# Patient Record
Sex: MALE | Race: WHITE | URBAN - METROPOLITAN AREA
[De-identification: names, ages, dates, MRNs, and addresses within clinical notes are randomized per-mention and may not be internally consistent; named-entity substitution may affect disease eponyms.]

---

## 2017-01-27 ENCOUNTER — OFFICE VISIT (OUTPATIENT)
Dept: DERMATOLOGY CLINIC | Facility: CLINIC | Age: 52
End: 2017-01-27

## 2017-01-27 DIAGNOSIS — L82.1 SEBORRHEIC KERATOSES: Primary | ICD-10-CM

## 2017-01-27 DIAGNOSIS — L81.4 LENTIGO: ICD-10-CM

## 2017-01-27 DIAGNOSIS — D23.9 BENIGN NEOPLASM OF SKIN, UNSPECIFIED LOCATION: ICD-10-CM

## 2017-01-27 PROCEDURE — 99213 OFFICE O/P EST LOW 20 MIN: CPT | Performed by: DERMATOLOGY

## 2017-01-27 PROCEDURE — 99212 OFFICE O/P EST SF 10 MIN: CPT | Performed by: DERMATOLOGY

## 2017-02-02 ENCOUNTER — OFFICE VISIT (OUTPATIENT)
Dept: INTERNAL MEDICINE CLINIC | Facility: CLINIC | Age: 52
End: 2017-02-02

## 2017-02-02 VITALS
HEART RATE: 75 BPM | SYSTOLIC BLOOD PRESSURE: 131 MMHG | HEIGHT: 70 IN | DIASTOLIC BLOOD PRESSURE: 86 MMHG | BODY MASS INDEX: 25.91 KG/M2 | RESPIRATION RATE: 16 BRPM | WEIGHT: 181 LBS

## 2017-02-02 DIAGNOSIS — K21.00 GASTROESOPHAGEAL REFLUX DISEASE WITH ESOPHAGITIS: Primary | ICD-10-CM

## 2017-02-02 DIAGNOSIS — F32.89 OTHER DEPRESSION: ICD-10-CM

## 2017-02-02 PROCEDURE — 99212 OFFICE O/P EST SF 10 MIN: CPT | Performed by: INTERNAL MEDICINE

## 2017-02-02 PROCEDURE — 99214 OFFICE O/P EST MOD 30 MIN: CPT | Performed by: INTERNAL MEDICINE

## 2017-02-02 RX ORDER — OMEPRAZOLE 40 MG/1
40 CAPSULE, DELAYED RELEASE ORAL DAILY
Qty: 90 CAPSULE | Refills: 3 | Status: SHIPPED | OUTPATIENT
Start: 2017-02-02 | End: 2017-06-20

## 2017-02-02 RX ORDER — FLUOXETINE 10 MG/1
CAPSULE ORAL
Qty: 90 CAPSULE | Refills: 3 | Status: SHIPPED | OUTPATIENT
Start: 2017-02-02 | End: 2018-02-24

## 2017-02-02 NOTE — PROGRESS NOTES
Felicia Katz is a 54year old male. HPI:   1. Gastroesophageal reflux disease with esophagitis    Has some reflux symptoms and takes PPI as needed to help with symptoms.  Not wearing tight clothing, not eating before bedtime and elevating the head of denies headaches    EXAM:   /86 mmHg  Pulse 75  Resp 16  Ht 5' 10\" (1.778 m)  Wt 181 lb (82.101 kg)  BMI 25.97 kg/m2  GENERAL: well developed, well nourished,in no apparent distress  SKIN: no rashes,no suspicious lesions  HEENT: atraumatic, normocep

## 2017-02-12 NOTE — PROGRESS NOTES
Santi Madrid is a 54year old male. HPI:     CC:  Patient presents with:  Moles: LOV 4/5/14. Patient presents with mole on right cheek for removal. Patient states it has gotten bigger, but not darker.  Denies pain        Allergies:  Review of patient' CHOLECYSTECTOMY  2008    UPPER GI ENDOSCOPY,BIOPSY  2011    Comment EGD with Biopsy       Social History   Marital Status:   Spouse Name: N/A    Years of Education: N/A  Number of Children: N/A     Occupational History  None on file     Social H cherry-red vascular papules scattered. See map today's date for lesions noted . Otherwise remarkable for lesions as noted on map.   See Assessment /Plan for additional history and physical exam also:    Assessment / plan:    No orders of the defined

## 2017-06-20 ENCOUNTER — OFFICE VISIT (OUTPATIENT)
Dept: INTERNAL MEDICINE CLINIC | Facility: CLINIC | Age: 52
End: 2017-06-20

## 2017-06-20 VITALS
DIASTOLIC BLOOD PRESSURE: 74 MMHG | BODY MASS INDEX: 26 KG/M2 | TEMPERATURE: 98 F | WEIGHT: 178.31 LBS | SYSTOLIC BLOOD PRESSURE: 118 MMHG | HEART RATE: 81 BPM

## 2017-06-20 DIAGNOSIS — J06.9 UPPER RESPIRATORY TRACT INFECTION, UNSPECIFIED TYPE: Primary | ICD-10-CM

## 2017-06-20 DIAGNOSIS — K21.9 GASTROESOPHAGEAL REFLUX DISEASE, ESOPHAGITIS PRESENCE NOT SPECIFIED: ICD-10-CM

## 2017-06-20 PROCEDURE — 99214 OFFICE O/P EST MOD 30 MIN: CPT | Performed by: INTERNAL MEDICINE

## 2017-06-20 PROCEDURE — 99212 OFFICE O/P EST SF 10 MIN: CPT | Performed by: INTERNAL MEDICINE

## 2017-06-20 RX ORDER — ESOMEPRAZOLE MAGNESIUM 40 MG/1
40 FOR SUSPENSION ORAL
Qty: 30 EACH | Refills: 5 | Status: SHIPPED | OUTPATIENT
Start: 2017-06-20 | End: 2018-06-12 | Stop reason: ALTCHOICE

## 2017-06-20 RX ORDER — AMOXICILLIN 875 MG/1
875 TABLET, COATED ORAL 2 TIMES DAILY
Qty: 20 TABLET | Refills: 0 | Status: SHIPPED | OUTPATIENT
Start: 2017-06-20 | End: 2018-06-12 | Stop reason: ALTCHOICE

## 2017-06-20 NOTE — PROGRESS NOTES
HPI:    Patient ID: Burgess Nunez is a 54year old male. Sore Throat   This is a new problem. The current episode started yesterday. The problem has been unchanged. There has been no fever.  Pertinent negatives include no diarrhea or shortness of stephanie 40 mg by mouth every morning before breakfast. Disp: 30 each Rfl: 5   FLUoxetine HCl (PROZAC) 10 MG Oral Cap take 1 (10MG)  by oral route  every day Disp: 90 capsule Rfl: 3   clonazepam (KLONOPIN) 0.5 MG Oral Tab Take  by mouth.  take 1 tablet (0.5MG)  by o (875 mg total) by mouth 2 (two) times daily.       Esomeprazole Magnesium (NEXIUM) 40 MG Oral Powd Pack 30 each 5      Sig: Take 40 mg by mouth every morning before breakfast.           Imaging & Referrals:  None       FD#[id number]  By signing my name below, I,

## 2018-02-15 ENCOUNTER — APPOINTMENT (OUTPATIENT)
Dept: GENERAL RADIOLOGY | Age: 53
End: 2018-02-15
Attending: PHYSICIAN ASSISTANT
Payer: COMMERCIAL

## 2018-02-15 ENCOUNTER — NURSE TRIAGE (OUTPATIENT)
Dept: OTHER | Age: 53
End: 2018-02-15

## 2018-02-15 ENCOUNTER — HOSPITAL ENCOUNTER (OUTPATIENT)
Age: 53
Discharge: HOME | End: 2018-02-15
Payer: COMMERCIAL

## 2018-02-15 VITALS
TEMPERATURE: 98 F | SYSTOLIC BLOOD PRESSURE: 130 MMHG | HEART RATE: 94 BPM | WEIGHT: 170 LBS | DIASTOLIC BLOOD PRESSURE: 91 MMHG | HEIGHT: 70 IN | OXYGEN SATURATION: 98 % | RESPIRATION RATE: 18 BRPM | BODY MASS INDEX: 24.34 KG/M2

## 2018-02-15 DIAGNOSIS — J11.1 INFLUENZA: Primary | ICD-10-CM

## 2018-02-15 PROCEDURE — 99213 OFFICE O/P EST LOW 20 MIN: CPT

## 2018-02-15 PROCEDURE — 99203 OFFICE O/P NEW LOW 30 MIN: CPT

## 2018-02-15 PROCEDURE — 71046 X-RAY EXAM CHEST 2 VIEWS: CPT | Performed by: PHYSICIAN ASSISTANT

## 2018-02-15 NOTE — ED PROVIDER NOTES
Patient Seen in: [facility]    History   Patient presents with:  Cough/URI    Stated Complaint: COUGH    HPI    Patient is a 24-year-old male with a history of GERD and anxiety who presents for evaluation of flu-like symp pain.       Positive for stated complaint: COUGH  Other systems are as noted in HPI. Constitutional and vital signs reviewed. All other systems reviewed and negative except as noted above.     Physical Exam   ED Triage Vitals [02/15/18 1000]  BP: 130/ -negative for acute pathology. Had a very lengthy discussion with patient regarding PE due to his recent travel, low suspicion at this time. Patient denies symptoms of chest pain, shortness of breath or flank pain at this time.   Does not want to go to ER

## 2018-02-15 NOTE — ED INITIAL ASSESSMENT (HPI)
PATIENT REPORTS FLU LIKE SYMPTOMS SINCE SUNDAY. T .  + COUGH. PATIENT REPORTS DRINKING PLENTY OF FLUIDS AND RESTING. PATIENT REPORTS BILATERAL FLANK DISCOMFORT WHEN TAKING DEEP BREATHS. STATES COUGH IS PRODUCTIVE AT TIMES.   LAST FEVER YESTERDAY

## 2018-02-15 NOTE — TELEPHONE ENCOUNTER
Action Requested: Summary for Provider     []  Critical Lab, Recommendations Needed  [] Need Additional Advice  [x]   FYI    []   Need Orders  [] Need Medications Sent to Pharmacy  []  Other     SUMMARY: Pt going to ER for fever, SOB, chest pain.     Pt rep

## 2018-02-16 NOTE — TELEPHONE ENCOUNTER
Spoke with patient (identified name and ) ,advised Dr Meenakshi Gao's note  and states that he cannot do it in the morning as he needs to be in his son's school, states that he is feeling little better, advised to call us back anytime if symptoms getting wo

## 2018-02-26 RX ORDER — OMEPRAZOLE 40 MG/1
CAPSULE, DELAYED RELEASE ORAL
Qty: 90 CAPSULE | Refills: 0 | Status: SHIPPED | OUTPATIENT
Start: 2018-02-26 | End: 2018-07-24

## 2018-02-26 RX ORDER — FLUOXETINE 10 MG/1
CAPSULE ORAL
Qty: 90 CAPSULE | Refills: 0 | Status: SHIPPED | OUTPATIENT
Start: 2018-02-26 | End: 2018-06-12 | Stop reason: ALTCHOICE

## 2018-05-25 ENCOUNTER — HOSPITAL ENCOUNTER (OUTPATIENT)
Age: 53
Discharge: HOME | End: 2018-05-25
Attending: PEDIATRICS
Payer: COMMERCIAL

## 2018-05-25 VITALS
HEART RATE: 70 BPM | TEMPERATURE: 99 F | OXYGEN SATURATION: 99 % | HEIGHT: 70 IN | DIASTOLIC BLOOD PRESSURE: 87 MMHG | RESPIRATION RATE: 18 BRPM | BODY MASS INDEX: 25.05 KG/M2 | SYSTOLIC BLOOD PRESSURE: 117 MMHG | WEIGHT: 175 LBS

## 2018-05-25 DIAGNOSIS — J02.9 SORE THROAT: Primary | ICD-10-CM

## 2018-05-25 DIAGNOSIS — J30.9 ALLERGIC RHINITIS, UNSPECIFIED SEASONALITY, UNSPECIFIED TRIGGER: ICD-10-CM

## 2018-05-25 PROCEDURE — 99212 OFFICE O/P EST SF 10 MIN: CPT

## 2018-05-25 PROCEDURE — 87430 STREP A AG IA: CPT

## 2018-05-25 NOTE — ED PROVIDER NOTES
Patient Seen in: [facility]    History   No chief complaint on file. Stated Complaint: SORE THROAT    HPI    Patient here with sore throat for 7 days. No travel,no sick contacts .   Patient denies sig shortness of eddi systems reviewed and negative except as noted above. PSFH elements reviewed from today and agreed except as otherwise stated in HPI.     Physical Exam   ED Triage Vitals [05/25/18 1038]  BP: 117/87  Pulse: 70  Resp: 18  Temp: 98.7 °F (37.1 °C)  Temp src:

## 2018-06-11 ENCOUNTER — NURSE TRIAGE (OUTPATIENT)
Dept: OTHER | Age: 53
End: 2018-06-11

## 2018-06-11 NOTE — TELEPHONE ENCOUNTER
Action Requested: Summary for Provider     []  Critical Lab, Recommendations Needed  [] Need Additional Advice  [x]   FYI    []   Need Orders  [] Need Medications Sent to Pharmacy  []  Other     SUMMARY: Appointment scheduled for tomorrow, 6/11/18 at 4:50p

## 2018-06-12 ENCOUNTER — OFFICE VISIT (OUTPATIENT)
Dept: INTERNAL MEDICINE CLINIC | Facility: CLINIC | Age: 53
End: 2018-06-12

## 2018-06-12 VITALS
SYSTOLIC BLOOD PRESSURE: 125 MMHG | BODY MASS INDEX: 26.2 KG/M2 | HEART RATE: 84 BPM | WEIGHT: 183 LBS | TEMPERATURE: 98 F | HEIGHT: 70 IN | DIASTOLIC BLOOD PRESSURE: 81 MMHG

## 2018-06-12 DIAGNOSIS — I10 HYPERTENSION, UNSPECIFIED TYPE: Primary | ICD-10-CM

## 2018-06-12 DIAGNOSIS — J01.10 ACUTE NON-RECURRENT FRONTAL SINUSITIS: ICD-10-CM

## 2018-06-12 PROCEDURE — 99212 OFFICE O/P EST SF 10 MIN: CPT | Performed by: INTERNAL MEDICINE

## 2018-06-12 PROCEDURE — 99214 OFFICE O/P EST MOD 30 MIN: CPT | Performed by: INTERNAL MEDICINE

## 2018-06-12 NOTE — PROGRESS NOTES
Ana Quesada is a 50year old male. Patient presents with:  Blood Pressure: 140/94 a few days ago, this morning it was 130/91. Recalls having a headache and wonders if the two are related.       HPI:   Pt comes as an urgent visit   c/c bp higher than rashes  RESPIRATORY: denies shortness of breath, cough, wheezing  CARDIOVASCULAR: denies chest pain on exertion, palpitations, swelling in feet  GI: denies abdominal pain and denies heartburn, nausea or vomiting  : No Pain on urination, change in the col

## 2018-06-12 NOTE — PATIENT INSTRUCTIONS
Eating Heart-Healthy Food: Using the [address] for your heart doesn’t have to be hard or boring. You just need to know how to make healthier choices. The DASH eating plan has been developed to help you do just that.  DASH stands for Dietary Appr · 1 egg  Best choices: Lean poultry and fish. Trim away visible fat. Broil, grill, roast, or boil instead of frying. Remove skin from poultry before eating.  Limit how much red meat you eat.  Nuts, seeds, beans  Servings: 4 to 5 a week  A serving is:  · One · When using whole spices, don't grind them until you need them. Crushing the berries and seeds with a mortar and pestle or grating whole nutmeg or cinnamon sticks just before adding them to your recipe will guarantee the most flavor.   · Dried herbs pack m Table salt contains the mineral sodium. Your body needs sodium to work normally. But too much sodium can make your health problems worse. Your healthcare provider is recommending a low-salt (also called low-sodium) diet for you.  Your total daily allowance Avoid: Smoked, pickled, brine-cured, or salted meats and fish. This includes peres, chipped beef, corned beef, hot dogs, deli meats, ham, kosher meats, salt pork, sausage, canned tuna, salted codfish, smoked salmon, herring, sardines, or anchovies.   Season · Regular (not instant) hot cereal, made without salt  Stay away from:  · Sausage, peres, and ham  · Flour tortillas  · Packaged muffins, pancakes, and biscuits  · Instant hot cereals  · Cottage cheese  For lunch and dinner  · Fresh fish, chicken, turkey, Sinus headache may cause pain in different places, depending on which sinuses are infected. There may be pain in the temples, forehead, top of the head, behind or around the eye, across the cheekbone, or into the upper teeth.   You may find that changing yo · You may use over-the-counter decongestants unless a similar medicine was prescribed. Nasal sprays or drops work the fastest. Use one that contains phenylephrine or oxymetazoline. First blow your nose gently to remove mucus. Then apply the spray or drops. · You may use over-the-counter medicine to control pain and fever, unless another pain medicine was prescribed. Talk with your doctor before using acetaminophen or ibuprofen if you have chronic liver or kidney disease.  Also talk with your doctor if you hav · Drink plenty of water, hot tea, and other liquids. This may help thin mucus. It also may promote sinus drainage. · Heat may help soothe painful areas of the face. Use a towel soaked in hot water.  Or,  the shower and direct the hot spray onto you · Unusual drowsiness or confusion  · Swelling of the forehead or eyelids  · Vision problems, including blurred or double vision  · Fever of 100.4ºF (38ºC) or higher, or as directed by your healthcare provider  · Seizure  · Breathing problems  · Symptoms no

## 2018-07-24 ENCOUNTER — OFFICE VISIT (OUTPATIENT)
Dept: INTERNAL MEDICINE CLINIC | Facility: CLINIC | Age: 53
End: 2018-07-24
Payer: COMMERCIAL

## 2018-07-24 VITALS
HEART RATE: 94 BPM | BODY MASS INDEX: 26.2 KG/M2 | RESPIRATION RATE: 16 BRPM | WEIGHT: 183 LBS | SYSTOLIC BLOOD PRESSURE: 132 MMHG | HEIGHT: 70 IN | DIASTOLIC BLOOD PRESSURE: 83 MMHG

## 2018-07-24 DIAGNOSIS — I10 ESSENTIAL HYPERTENSION WITH GOAL BLOOD PRESSURE LESS THAN 140/90: Primary | ICD-10-CM

## 2018-07-24 DIAGNOSIS — R51.9 HEADACHE DISORDER: ICD-10-CM

## 2018-07-24 PROCEDURE — 99214 OFFICE O/P EST MOD 30 MIN: CPT | Performed by: INTERNAL MEDICINE

## 2018-07-24 PROCEDURE — 99212 OFFICE O/P EST SF 10 MIN: CPT | Performed by: INTERNAL MEDICINE

## 2018-07-24 RX ORDER — METOPROLOL SUCCINATE 25 MG/1
25 TABLET, EXTENDED RELEASE ORAL DAILY
Qty: 90 TABLET | Refills: 1 | Status: SHIPPED | OUTPATIENT
Start: 2018-07-24 | End: 2019-01-15

## 2018-07-24 RX ORDER — FLUOXETINE 10 MG/1
10 CAPSULE ORAL DAILY
Qty: 90 CAPSULE | Refills: 1 | Status: SHIPPED | OUTPATIENT
Start: 2018-07-24 | End: 2019-01-15

## 2018-07-24 NOTE — PROGRESS NOTES
Jefferson Mendoza is a 50year old male. HPI:   1. Essential hypertension with goal blood pressure less than 140/90    Has been getting more headaches recently. Come and go. Hurts around head frequently./ No visual change or loss of consciousness.  Not usu will help to curb one's appetite, control weight and lead to better blood pressure control. Record blood pressures at home and bring readings to your next office visit to review.     2. Headache disorder    Suspect heaqdaches are due to Blood pressures bein

## 2018-10-08 ENCOUNTER — OFFICE VISIT (OUTPATIENT)
Dept: INTERNAL MEDICINE CLINIC | Facility: CLINIC | Age: 53
End: 2018-10-08
Payer: COMMERCIAL

## 2018-10-08 VITALS
RESPIRATION RATE: 16 BRPM | WEIGHT: 183 LBS | HEIGHT: 70 IN | BODY MASS INDEX: 26.2 KG/M2 | HEART RATE: 66 BPM | SYSTOLIC BLOOD PRESSURE: 115 MMHG | DIASTOLIC BLOOD PRESSURE: 71 MMHG

## 2018-10-08 DIAGNOSIS — R06.83 SNORING: ICD-10-CM

## 2018-10-08 DIAGNOSIS — Z00.00 PHYSICAL EXAM, ANNUAL: Primary | ICD-10-CM

## 2018-10-08 PROCEDURE — 99396 PREV VISIT EST AGE 40-64: CPT | Performed by: INTERNAL MEDICINE

## 2018-10-08 NOTE — PROGRESS NOTES
Ganga Archer is a 50year old male who presents for a complete physical exam.   HPI:   Pt complains of some continued mild headaches. There is no immunization history on file for this patient.   Wt Readings from Last 6 Encounters:  10/08/18 : 183 per week.   Diet: watches minimally     REVIEW OF SYSTEMS:     GENERAL: feels well otherwise  SKIN: denies any unusual skin lesions  EYES:denies blurred vision or double vision  HEENT: denies nasal congestion, sinus pain or ST  LUNGS: denies shortness of br METABOLIC PANEL (14); Future  - LIPID PANEL; Future  - PSA SCREEN; Future  - ASSAY, THYROID STIM HORMONE; Future  - URINALYSIS, ROUTINE; Future  - VITAMIN D, 25-HYDROXY; Future    2.  Snoring    Has been snoring and getting sleepy in the AM. Has some AM hea

## 2018-11-07 ENCOUNTER — TELEPHONE (OUTPATIENT)
Dept: FAMILY MEDICINE CLINIC | Facility: CLINIC | Age: 53
End: 2018-11-07

## 2019-01-10 ENCOUNTER — TELEPHONE (OUTPATIENT)
Dept: SURGERY | Facility: CLINIC | Age: 54
End: 2019-01-10

## 2019-01-10 ENCOUNTER — OFFICE VISIT (OUTPATIENT)
Dept: SURGERY | Facility: CLINIC | Age: 54
End: 2019-01-10
Payer: COMMERCIAL

## 2019-01-10 VITALS
HEART RATE: 61 BPM | TEMPERATURE: 98 F | WEIGHT: 183 LBS | SYSTOLIC BLOOD PRESSURE: 152 MMHG | HEIGHT: 70 IN | DIASTOLIC BLOOD PRESSURE: 82 MMHG | BODY MASS INDEX: 26.2 KG/M2

## 2019-01-10 DIAGNOSIS — R39.13 SPLIT OF URINARY STREAM: ICD-10-CM

## 2019-01-10 DIAGNOSIS — R31.0 GROSS HEMATURIA: Primary | ICD-10-CM

## 2019-01-10 DIAGNOSIS — Z80.52 FAMILY HISTORY OF BLADDER CANCER: ICD-10-CM

## 2019-01-10 DIAGNOSIS — R35.0 URINARY FREQUENCY: ICD-10-CM

## 2019-01-10 DIAGNOSIS — Z87.448 HISTORY OF URETHRAL STRICTURE: ICD-10-CM

## 2019-01-10 DIAGNOSIS — Z12.5 SCREENING PSA (PROSTATE SPECIFIC ANTIGEN): ICD-10-CM

## 2019-01-10 PROCEDURE — 99212 OFFICE O/P EST SF 10 MIN: CPT | Performed by: NURSE PRACTITIONER

## 2019-01-10 PROCEDURE — 99244 OFF/OP CNSLTJ NEW/EST MOD 40: CPT | Performed by: NURSE PRACTITIONER

## 2019-01-10 RX ORDER — SULFAMETHOXAZOLE AND TRIMETHOPRIM 800; 160 MG/1; MG/1
1 TABLET ORAL 2 TIMES DAILY
Qty: 28 TABLET | Refills: 0 | Status: SHIPPED | OUTPATIENT
Start: 2019-01-10 | End: 2019-01-24

## 2019-01-10 NOTE — PROGRESS NOTES
HPI:    Patient ID: Stephanie Quispe is a 48year old male.     Patient is a 48year old male who presents to the clinic for a consult     Patient states a couple days ago he went to sit on the toilet to urinate and notice he had a couple drops of blood at Medications:  Metoprolol Succinate ER 25 MG Oral Tablet 24 Hr Take 1 tablet (25 mg total) by mouth daily. Disp: 90 tablet Rfl: 1   FLUoxetine HCl 10 MG Oral Cap Take 1 capsule (10 mg total) by mouth daily.  Disp: 90 capsule Rfl: 1     Allergies:No Known All Skin: Skin is warm and dry.             ASSESSMENT/PLAN:   Gross hematuria  (primary encounter diagnosis)  Split of urinary stream  Urinary frequency  History of urethral stricture  Family history of bladder cancer  Screening psa (prostate specific antige

## 2019-01-11 ENCOUNTER — LAB ENCOUNTER (OUTPATIENT)
Dept: LAB | Facility: HOSPITAL | Age: 54
End: 2019-01-11
Attending: NURSE PRACTITIONER
Payer: COMMERCIAL

## 2019-01-11 DIAGNOSIS — R31.0 GROSS HEMATURIA: ICD-10-CM

## 2019-01-11 DIAGNOSIS — Z00.00 PHYSICAL EXAM, ANNUAL: ICD-10-CM

## 2019-01-11 LAB
ALBUMIN SERPL BCP-MCNC: 4.4 G/DL (ref 3.5–4.8)
ALBUMIN/GLOB SERPL: 1.6 {RATIO} (ref 1–2)
ALP SERPL-CCNC: 73 U/L (ref 32–100)
ALT SERPL-CCNC: 14 U/L (ref 17–63)
ANION GAP SERPL CALC-SCNC: 13 MMOL/L (ref 0–18)
AST SERPL-CCNC: 22 U/L (ref 15–41)
BACTERIA UR QL AUTO: NEGATIVE /HPF
BASOPHILS # BLD: 0.1 K/UL (ref 0–0.2)
BASOPHILS NFR BLD: 1 %
BILIRUB SERPL-MCNC: 2.4 MG/DL (ref 0.3–1.2)
BILIRUB UR QL: NEGATIVE
BUN SERPL-MCNC: 8 MG/DL (ref 8–20)
BUN/CREAT SERPL: 8.3 (ref 10–20)
CALCIUM SERPL-MCNC: 9.4 MG/DL (ref 8.5–10.5)
CHLORIDE SERPL-SCNC: 101 MMOL/L (ref 95–110)
CLARITY UR: CLEAR
CO2 SERPL-SCNC: 25 MMOL/L (ref 22–32)
COLOR UR: YELLOW
COMPLEXED PSA SERPL-MCNC: 2.67 NG/ML (ref 0.01–4)
CREAT SERPL-MCNC: 0.96 MG/DL (ref 0.5–1.5)
EOSINOPHIL # BLD: 0 K/UL (ref 0–0.7)
EOSINOPHIL NFR BLD: 1 %
ERYTHROCYTE [DISTWIDTH] IN BLOOD BY AUTOMATED COUNT: 13.6 % (ref 11–15)
GLOBULIN PLAS-MCNC: 2.8 G/DL (ref 2.5–3.7)
GLUCOSE SERPL-MCNC: 101 MG/DL (ref 70–99)
GLUCOSE UR-MCNC: NEGATIVE MG/DL
HCT VFR BLD AUTO: 44.9 % (ref 41–52)
HGB BLD-MCNC: 15.1 G/DL (ref 13.5–17.5)
KETONES UR-MCNC: NEGATIVE MG/DL
LEUKOCYTE ESTERASE UR QL STRIP.AUTO: NEGATIVE
LYMPHOCYTES # BLD: 1.2 K/UL (ref 1–4)
LYMPHOCYTES NFR BLD: 17 %
MCH RBC QN AUTO: 30.7 PG (ref 27–32)
MCHC RBC AUTO-ENTMCNC: 33.5 G/DL (ref 32–37)
MCV RBC AUTO: 91.5 FL (ref 80–100)
MONOCYTES # BLD: 0.6 K/UL (ref 0–1)
MONOCYTES NFR BLD: 9 %
NEUTROPHILS # BLD AUTO: 5 K/UL (ref 1.8–7.7)
NEUTROPHILS NFR BLD: 73 %
NITRITE UR QL STRIP.AUTO: NEGATIVE
OSMOLALITY UR CALC.SUM OF ELEC: 286 MOSM/KG (ref 275–295)
PATIENT FASTING: NO
PH UR: 7 [PH] (ref 5–8)
PLATELET # BLD AUTO: 247 K/UL (ref 140–400)
PMV BLD AUTO: 9.4 FL (ref 7.4–10.3)
POTASSIUM SERPL-SCNC: 4.5 MMOL/L (ref 3.3–5.1)
PROT SERPL-MCNC: 7.2 G/DL (ref 5.9–8.4)
PROT UR-MCNC: NEGATIVE MG/DL
PSA SERPL-MCNC: 2.3 NG/ML (ref 0–4)
RBC # BLD AUTO: 4.91 M/UL (ref 4.5–5.9)
RBC #/AREA URNS AUTO: 1 /HPF
SODIUM SERPL-SCNC: 139 MMOL/L (ref 136–144)
SP GR UR STRIP: 1.01 (ref 1–1.03)
TSH SERPL-ACNC: 1.61 UIU/ML (ref 0.45–5.33)
UROBILINOGEN UR STRIP-ACNC: <2
VIT C UR-MCNC: NEGATIVE MG/DL
WBC # BLD AUTO: 6.9 K/UL (ref 4–11)
WBC #/AREA URNS AUTO: <1 /HPF

## 2019-01-11 PROCEDURE — 36415 COLL VENOUS BLD VENIPUNCTURE: CPT

## 2019-01-11 PROCEDURE — 87086 URINE CULTURE/COLONY COUNT: CPT

## 2019-01-11 PROCEDURE — 85025 COMPLETE CBC W/AUTO DIFF WBC: CPT

## 2019-01-11 PROCEDURE — 84443 ASSAY THYROID STIM HORMONE: CPT

## 2019-01-11 PROCEDURE — 80053 COMPREHEN METABOLIC PANEL: CPT

## 2019-01-11 PROCEDURE — 81001 URINALYSIS AUTO W/SCOPE: CPT

## 2019-01-11 PROCEDURE — 82306 VITAMIN D 25 HYDROXY: CPT

## 2019-01-14 LAB — 25(OH)D3 SERPL-MCNC: 15.5 NG/ML (ref 30–100)

## 2019-01-15 DIAGNOSIS — E55.9 VITAMIN D DEFICIENCY: Primary | ICD-10-CM

## 2019-01-15 RX ORDER — ERGOCALCIFEROL 1.25 MG/1
50000 CAPSULE ORAL WEEKLY
Qty: 12 CAPSULE | Refills: 0 | Status: SHIPPED | OUTPATIENT
Start: 2019-01-15 | End: 2019-04-07

## 2019-01-15 RX ORDER — FLUOXETINE 10 MG/1
CAPSULE ORAL
Qty: 90 CAPSULE | Refills: 1 | Status: SHIPPED | OUTPATIENT
Start: 2019-01-15 | End: 2019-07-12

## 2019-01-15 RX ORDER — METOPROLOL SUCCINATE 25 MG/1
TABLET, EXTENDED RELEASE ORAL
Qty: 90 TABLET | Refills: 1 | Status: SHIPPED | OUTPATIENT
Start: 2019-01-15 | End: 2019-07-18

## 2019-03-05 ENCOUNTER — TELEPHONE (OUTPATIENT)
Dept: OTHER | Age: 54
End: 2019-03-05

## 2019-03-05 NOTE — TELEPHONE ENCOUNTER
Patient calling back and stated that his depression is getting worse, cannot concentrate, cannot sleep, lost of appetite, under a lot of stress right now with work and family issue, denies planning or thinking about hurting self or others,no cp, no sob, be

## 2019-03-05 NOTE — TELEPHONE ENCOUNTER
Patient advised of recommendations per Dr INGRAM, patient agreed to follow up with Georgi Potts, aware no medication changes at this time should follow up with psychiatrist/psychologist regarding further medication recommendations.  No other questions at this t

## 2019-03-05 NOTE — TELEPHONE ENCOUNTER
I would have patient call [facility] and get intake evaluation and see psychologist or psychiatrist.

## 2019-03-05 NOTE — TELEPHONE ENCOUNTER
Regarding: Prescription Question  Contact: [phone]  ----- Message from [address] sent at 3/5/2019  1:13 PM CST -----    Doctor Jean Mcgee,     I'm not handling some things as well as I should.    Is it okay to take two Fluoxetine a day or is it better t

## 2019-04-06 NOTE — TELEPHONE ENCOUNTER
pls confirm ok to decline refill request at this time? Review pended refill request as it does not fall under a protocol.     Last Rx: 01/2018 #12#0  LOV: 10/8/18  Last labs:  Notes recorded by Love Alfonso MD on 1/15/2019 at 12:05 PM CST  Blood

## 2019-04-07 RX ORDER — ERGOCALCIFEROL 1.25 MG/1
CAPSULE ORAL
Qty: 12 CAPSULE | Refills: 0 | Status: SHIPPED | OUTPATIENT
Start: 2019-04-07 | End: 2019-07-29

## 2019-05-09 ENCOUNTER — OFFICE VISIT (OUTPATIENT)
Dept: DERMATOLOGY CLINIC | Facility: CLINIC | Age: 54
End: 2019-05-09
Payer: COMMERCIAL

## 2019-05-09 DIAGNOSIS — L82.1 SEBORRHEIC KERATOSES: Primary | ICD-10-CM

## 2019-05-09 DIAGNOSIS — D23.71: ICD-10-CM

## 2019-05-09 PROCEDURE — 99212 OFFICE O/P EST SF 10 MIN: CPT | Performed by: DERMATOLOGY

## 2019-05-09 PROCEDURE — 99213 OFFICE O/P EST LOW 20 MIN: CPT | Performed by: DERMATOLOGY

## 2019-05-09 NOTE — PROGRESS NOTES
HPI:     Chief Complaint     Lesion        HPI     Lesion      Additional comments: LOV 1/27/2017. Pt presenting with lesion to R middle back. c/o change in color. Pt denies personal and family hx of skin cancer.            Last edited by Дмитрий Mariano, Marital status:       Spouse name: Not on file      Number of children: Not on file      Years of education: Not on file      Highest education level: Not on file    Occupational History      Not on file    Social Needs      Financial resource str History Narrative      Not on file    Family History   Problem Relation Age of Onset   • Hypertension Father 68   • Cancer Father         bladder cancer    • Hypertension Mother 68   • Gastro-Intestinal Disorder Mother         GERD   • Arthritis Mother

## 2019-05-18 ENCOUNTER — HOSPITAL ENCOUNTER (EMERGENCY)
Facility: HOSPITAL | Age: 54
Discharge: HOME | End: 2019-05-18
Payer: COMMERCIAL

## 2019-05-18 VITALS
DIASTOLIC BLOOD PRESSURE: 85 MMHG | HEART RATE: 66 BPM | BODY MASS INDEX: 24.34 KG/M2 | TEMPERATURE: 99 F | OXYGEN SATURATION: 97 % | HEIGHT: 70 IN | SYSTOLIC BLOOD PRESSURE: 136 MMHG | WEIGHT: 170 LBS | RESPIRATION RATE: 20 BRPM

## 2019-05-18 DIAGNOSIS — S61.311A LACERATION OF LEFT INDEX FINGER WITHOUT FOREIGN BODY WITH DAMAGE TO NAIL, INITIAL ENCOUNTER: Primary | ICD-10-CM

## 2019-05-18 PROCEDURE — 99283 EMERGENCY DEPT VISIT LOW MDM: CPT

## 2019-05-18 PROCEDURE — 90471 IMMUNIZATION ADMIN: CPT

## 2019-05-18 PROCEDURE — 12001 RPR S/N/AX/GEN/TRNK 2.5CM/<: CPT

## 2019-05-18 NOTE — ED PROVIDER NOTES
Patient Seen in: [facility]    History   No chief complaint on file.     Stated Complaint: left index finger laceration    HPI    16-year-old male to the emergency department with a 0.5 cm laceration to the left second digit chang vital signs reviewed. All other systems reviewed and negative except as noted above. PSFH elements reviewed from today and agreed except as otherwise stated in HPI.     Physical Exam     ED Triage Vitals [05/18/19 1746]   /85   Pulse 66   Resp (primary encounter diagnosis)    Disposition:  Discharge    Follow-up:  Naresh Sterling MD  [address] Lawanda Tucker MD  [facility] [address] [phone]            Medication

## 2019-07-08 RX ORDER — ERGOCALCIFEROL 1.25 MG/1
CAPSULE ORAL
Qty: 12 CAPSULE | Refills: 0 | OUTPATIENT
Start: 2019-07-08

## 2019-07-12 RX ORDER — FLUOXETINE 10 MG/1
CAPSULE ORAL
Qty: 90 CAPSULE | Refills: 0 | Status: SHIPPED | OUTPATIENT
Start: 2019-07-12 | End: 2019-10-05

## 2019-07-18 ENCOUNTER — OFFICE VISIT (OUTPATIENT)
Dept: INTERNAL MEDICINE CLINIC | Facility: CLINIC | Age: 54
End: 2019-07-18
Payer: COMMERCIAL

## 2019-07-18 VITALS
HEIGHT: 70 IN | TEMPERATURE: 98 F | SYSTOLIC BLOOD PRESSURE: 138 MMHG | DIASTOLIC BLOOD PRESSURE: 80 MMHG | WEIGHT: 171 LBS | RESPIRATION RATE: 17 BRPM | HEART RATE: 64 BPM | BODY MASS INDEX: 24.48 KG/M2

## 2019-07-18 DIAGNOSIS — Z12.12 SCREENING FOR COLORECTAL CANCER: ICD-10-CM

## 2019-07-18 DIAGNOSIS — Z20.2 POTENTIAL EXPOSURE TO STD: ICD-10-CM

## 2019-07-18 DIAGNOSIS — Z12.11 SCREENING FOR COLORECTAL CANCER: ICD-10-CM

## 2019-07-18 DIAGNOSIS — R51.9 HEADACHE DISORDER: ICD-10-CM

## 2019-07-18 DIAGNOSIS — I10 ESSENTIAL HYPERTENSION WITH GOAL BLOOD PRESSURE LESS THAN 140/90: Primary | ICD-10-CM

## 2019-07-18 PROCEDURE — 99214 OFFICE O/P EST MOD 30 MIN: CPT | Performed by: INTERNAL MEDICINE

## 2019-07-18 RX ORDER — METOPROLOL SUCCINATE 25 MG/1
25 TABLET, EXTENDED RELEASE ORAL
Qty: 90 TABLET | Refills: 3 | Status: SHIPPED | OUTPATIENT
Start: 2019-07-18 | End: 2020-02-19

## 2019-07-18 NOTE — PROGRESS NOTES
Kg Maya is a 48year old male. HPI:     1. Essential hypertension with goal blood pressure less than 140/90    Has been getting more headaches recently. Come and go. Hurts around head frequently./ No visual change or loss of consciousness.  Not u supple,no adenopathy,no bruits  LUNGS: clear to auscultation  CARDIO: RRR without murmur  GI: good BS's,no masses, HSM or tenderness  EXTREMITIES: no cyanosis, clubbing or edema    ASSESSMENT AND PLAN:     1.  Essential hypertension with goal blood pressure

## 2019-07-24 ENCOUNTER — HOSPITAL ENCOUNTER (OUTPATIENT)
Age: 54
Discharge: HOME | End: 2019-07-24
Attending: EMERGENCY MEDICINE
Payer: COMMERCIAL

## 2019-07-24 VITALS
SYSTOLIC BLOOD PRESSURE: 130 MMHG | BODY MASS INDEX: 25.05 KG/M2 | RESPIRATION RATE: 20 BRPM | DIASTOLIC BLOOD PRESSURE: 75 MMHG | HEART RATE: 84 BPM | OXYGEN SATURATION: 98 % | HEIGHT: 70 IN | TEMPERATURE: 98 F | WEIGHT: 175 LBS

## 2019-07-24 DIAGNOSIS — J02.0 ACUTE STREPTOCOCCAL PHARYNGITIS: Primary | ICD-10-CM

## 2019-07-24 LAB — S PYO AG THROAT QL: POSITIVE

## 2019-07-24 PROCEDURE — 99214 OFFICE O/P EST MOD 30 MIN: CPT

## 2019-07-24 PROCEDURE — 99213 OFFICE O/P EST LOW 20 MIN: CPT

## 2019-07-24 PROCEDURE — 87430 STREP A AG IA: CPT

## 2019-07-24 RX ORDER — PREDNISONE 20 MG/1
60 TABLET ORAL ONCE
Status: COMPLETED | OUTPATIENT
Start: 2019-07-24 | End: 2019-07-24

## 2019-07-24 RX ORDER — PENICILLIN V POTASSIUM 500 MG/1
500 TABLET ORAL 3 TIMES DAILY
Qty: 30 TABLET | Refills: 0 | Status: SHIPPED | OUTPATIENT
Start: 2019-07-24 | End: 2019-08-03

## 2019-07-24 NOTE — ED PROVIDER NOTES
Patient Seen in: [facility]    History   Patient presents with:  Sore Throat    Stated Complaint: SORE THROAT    HPI    Patient is a 37-year-old male without significant past medical history presents with complaints of so Normocephalic atraumatic  Eyes: Conjunctiva noninjected, no exudate  Ears: Tympanic membranes clear bilaterally, no swelling or discharge in the canals, mastoid sinuses nontender  Nose: No visible mucoid or purulent discharge, no erythema or edema of the m

## 2019-07-26 ENCOUNTER — LAB ENCOUNTER (OUTPATIENT)
Dept: LAB | Facility: HOSPITAL | Age: 54
End: 2019-07-26
Attending: INTERNAL MEDICINE
Payer: COMMERCIAL

## 2019-07-26 DIAGNOSIS — E55.9 VITAMIN D DEFICIENCY: ICD-10-CM

## 2019-07-26 DIAGNOSIS — Z20.2 POTENTIAL EXPOSURE TO STD: ICD-10-CM

## 2019-07-26 LAB
HBV SURFACE AG SER-ACNC: <0.1 [IU]/L
HBV SURFACE AG SERPL QL IA: NONREACTIVE
HCV AB SERPL QL IA: NONREACTIVE

## 2019-07-26 PROCEDURE — 86780 TREPONEMA PALLIDUM: CPT

## 2019-07-26 PROCEDURE — 87389 HIV-1 AG W/HIV-1&-2 AB AG IA: CPT

## 2019-07-26 PROCEDURE — 36415 COLL VENOUS BLD VENIPUNCTURE: CPT

## 2019-07-26 PROCEDURE — 86803 HEPATITIS C AB TEST: CPT

## 2019-07-26 PROCEDURE — 87340 HEPATITIS B SURFACE AG IA: CPT

## 2019-07-26 PROCEDURE — 82306 VITAMIN D 25 HYDROXY: CPT

## 2019-07-26 PROCEDURE — 87491 CHLMYD TRACH DNA AMP PROBE: CPT

## 2019-07-26 PROCEDURE — 87591 N.GONORRHOEAE DNA AMP PROB: CPT

## 2019-07-28 LAB
C TRACH DNA SPEC QL NAA+PROBE: NEGATIVE
N GONORRHOEA DNA SPEC QL NAA+PROBE: NEGATIVE

## 2019-07-29 LAB
25(OH)D3 SERPL-MCNC: 22.8 NG/ML (ref 30–100)
T PALLIDUM AB SER QL: NEGATIVE

## 2019-10-07 RX ORDER — FLUOXETINE 10 MG/1
CAPSULE ORAL
Qty: 90 CAPSULE | Refills: 1 | Status: SHIPPED | OUTPATIENT
Start: 2019-10-07 | End: 2020-02-19

## 2019-10-07 NOTE — TELEPHONE ENCOUNTER
Refill passed per [facility] protocol.   Refill Protocol Appointment Criteria  · Appointment scheduled in the past 6 months or in the next 3 months  Recent Outpatient Visits            2 months ago Essential hypertension with goal blood pressure less t

## 2019-10-21 RX ORDER — ERGOCALCIFEROL 1.25 MG/1
50000 CAPSULE ORAL
Qty: 12 CAPSULE | Refills: 1 | Status: SHIPPED | OUTPATIENT
Start: 2019-10-21 | End: 2020-01-07

## 2019-10-21 NOTE — TELEPHONE ENCOUNTER
Review pended refill request as it does not fall under a protocol.   Requested Prescriptions     Pending Prescriptions Disp Refills   • ERGOCALCIFEROL 35572 units Oral Cap [Pharmacy Med Name: VITAMIN D2 1.25MG(50,000 UNIT)] 12 capsule 0     Sig: Take 1 caps

## 2020-02-19 ENCOUNTER — OFFICE VISIT (OUTPATIENT)
Dept: INTERNAL MEDICINE CLINIC | Facility: CLINIC | Age: 55
End: 2020-02-19
Payer: COMMERCIAL

## 2020-02-19 VITALS
HEIGHT: 70 IN | HEART RATE: 84 BPM | BODY MASS INDEX: 26.92 KG/M2 | WEIGHT: 188 LBS | DIASTOLIC BLOOD PRESSURE: 90 MMHG | SYSTOLIC BLOOD PRESSURE: 139 MMHG

## 2020-02-19 DIAGNOSIS — I10 ESSENTIAL HYPERTENSION WITH GOAL BLOOD PRESSURE LESS THAN 140/90: Primary | ICD-10-CM

## 2020-02-19 DIAGNOSIS — F33.1 MODERATE EPISODE OF RECURRENT MAJOR DEPRESSIVE DISORDER (HCC): ICD-10-CM

## 2020-02-19 PROCEDURE — 99214 OFFICE O/P EST MOD 30 MIN: CPT | Performed by: INTERNAL MEDICINE

## 2020-02-19 RX ORDER — METOPROLOL SUCCINATE 25 MG/1
25 TABLET, EXTENDED RELEASE ORAL
Qty: 90 TABLET | Refills: 3 | Status: SHIPPED | OUTPATIENT
Start: 2020-02-19 | End: 2021-02-17

## 2020-02-19 RX ORDER — FLUOXETINE HYDROCHLORIDE 20 MG/1
20 CAPSULE ORAL DAILY
Qty: 90 CAPSULE | Refills: 3 | Status: SHIPPED | OUTPATIENT
Start: 2020-02-19 | End: 2021-02-17

## 2020-02-19 NOTE — PROGRESS NOTES
Jelani Pete is a 48year old male. HPI:     1. Essential hypertension with goal blood pressure less than 140/90    Has been getting more headaches recently. Come and go. Hurts around head frequently./ No visual change or loss of consciousness.  Not usu rashes,no suspicious lesions  HEENT: atraumatic, normocephalic,ears and throat are clear  NECK: supple,no adenopathy,no bruits  LUNGS: clear to auscultation  CARDIO: RRR without murmur  GI: good BS's,no masses, HSM or tenderness  EXTREMITIES: no cyanosis,

## 2021-01-05 ENCOUNTER — NURSE TRIAGE (OUTPATIENT)
Dept: INTERNAL MEDICINE CLINIC | Facility: CLINIC | Age: 56
End: 2021-01-05

## 2021-01-05 DIAGNOSIS — Z20.822 EXPOSURE TO COVID-19 VIRUS: Primary | ICD-10-CM

## 2021-01-05 NOTE — TELEPHONE ENCOUNTER
Action Requested: Summary for Provider     []  Critical Lab, Recommendations Needed  [] Need Additional Advice  [x]   FYI    []   Need Orders  [] Need Medications Sent to Pharmacy  []  Other     SUMMARY:   Spoke with pt,  verified, pt stated his headach

## 2021-01-05 NOTE — TELEPHONE ENCOUNTER
----- Message from Siena Cerrato sent at 1/5/2021 12:04 PM CST -----  Regarding: Non-Urgent Medical Question  Contact: [phone]  Hello,     I stayed home from work because I didn't feel well. Nausea, headache and a little tired.    Can't go back to w

## 2021-01-06 ENCOUNTER — TELEMEDICINE (OUTPATIENT)
Dept: INTERNAL MEDICINE CLINIC | Facility: CLINIC | Age: 56
End: 2021-01-06
Payer: COMMERCIAL

## 2021-01-06 DIAGNOSIS — R53.83 FATIGUE, UNSPECIFIED TYPE: Primary | ICD-10-CM

## 2021-01-06 PROCEDURE — 99998 NO SHOW: CPT | Performed by: INTERNAL MEDICINE

## 2021-01-06 PROCEDURE — 99213 OFFICE O/P EST LOW 20 MIN: CPT | Performed by: INTERNAL MEDICINE

## 2021-01-06 NOTE — TELEPHONE ENCOUNTER
Spoke with patient, (  verified ) informed that COVID  Test has  been ordered      Provided information to call Central scheduling at [phone]    Patient verbalizes understanding and agrees.

## 2021-01-07 ENCOUNTER — LAB ENCOUNTER (OUTPATIENT)
Dept: LAB | Facility: HOSPITAL | Age: 56
End: 2021-01-07
Attending: INTERNAL MEDICINE
Payer: COMMERCIAL

## 2021-01-07 DIAGNOSIS — Z20.822 EXPOSURE TO COVID-19 VIRUS: ICD-10-CM

## 2021-01-09 LAB — SARS-COV-2 BY PCR: NOT DETECTED

## 2021-01-12 PROBLEM — R53.83 FATIGUE: Status: ACTIVE | Noted: 2021-01-12

## 2021-01-12 NOTE — PROGRESS NOTES
Patient ID: Chanelle Summers is a 50year old male. No chief complaint on file. Virtual/Telephone Check-In    Chanelle Summers verbally consents to a Virtual/Telephone Check-In service on 01/06/21.  Patient understands and accepts financial responsibil Sexual activity: Not on file    Lifestyle      Physical activity        Days per week: Not on file        Minutes per session: Not on file      Stress: Not on file    Relationships      Social connections        Talks on phone: Not on file        Gets toge medication he is on and follow-up as needed in a few weeks.       Time spent on encounter  12 minutes   Telephone time 9 minutes   Documentation time 3 minutes     Bony Giang understands phone evaluation is not a substitute for face-to-face examination

## 2021-02-18 RX ORDER — METOPROLOL SUCCINATE 25 MG/1
25 TABLET, EXTENDED RELEASE ORAL
Qty: 90 TABLET | Refills: 3 | Status: SHIPPED | OUTPATIENT
Start: 2021-02-18

## 2021-02-18 RX ORDER — FLUOXETINE HYDROCHLORIDE 20 MG/1
20 CAPSULE ORAL DAILY
Qty: 90 CAPSULE | Refills: 3 | Status: SHIPPED | OUTPATIENT
Start: 2021-02-18

## 2022-02-14 ENCOUNTER — TELEPHONE (OUTPATIENT)
Dept: INTERNAL MEDICINE CLINIC | Facility: CLINIC | Age: 57
End: 2022-02-14

## 2022-02-15 RX ORDER — METOPROLOL SUCCINATE 25 MG/1
25 TABLET, EXTENDED RELEASE ORAL
Qty: 90 TABLET | Refills: 0 | Status: SHIPPED | OUTPATIENT
Start: 2022-02-15 | End: 2022-02-18

## 2022-02-15 NOTE — TELEPHONE ENCOUNTER
Please review refill failed/no protocol. Patient scheduled appointment on 2/18/22 to establish care, previous patient of Dr. Dago Hernandez. Pended if okay to refill until appointment. Requested Prescriptions     Pending Prescriptions Disp Refills    metoprolol succinate 25 MG Oral Tablet 24 Hr 90 tablet 0     Sig: Take 1 tablet (25 mg total) by mouth once daily. Recent Visits  No visits were found meeting these conditions. Showing recent visits within past 540 days with a meds authorizing provider and meeting all other requirements  Future Appointments  Date Type Provider Dept   02/18/22 Appointment Marcelo Jang MD [facility]   Showing future appointments within next 150 days with a meds authorizing provider and meeting all other requirements    Requested Prescriptions   Pending Prescriptions Disp Refills    metoprolol succinate 25 MG Oral Tablet 24 Hr 90 tablet 0     Sig: Take 1 tablet (25 mg total) by mouth once daily.         Hypertensive Medications Protocol Failed - 2/15/2022  9:15 AM        Failed - CMP or BMP in past 12 months        Failed - Appointment in past 6 or next 3 months        Passed - GFR Non- > 50     Lab Results   Component Value Date    GFRNAA >60 01/11/2019                     Future Appointments         Provider Department Appt Notes    In 3 days Marcelo Jang MD [place] [facility] [address] [facility]           Recent Outpatient Visits              1 year ago Fatigue, unspecified type    [place] [facility] [place], [address] Anika Tejada MD    Telemedicine    1 year ago Essential hypertension with goal blood pressure less than 140/90    [facility], Kim Garza MD    Office Visit    2 years ago Essential hypertension with goal blood pressure less than 140/90    [place] [facility], [address], Kim Garza MD    Office Visit    2 years ago Seborrheic keratoses    [place] [address] [facility] Gustabo Liu MD    Office Visit    3 years ago Gross hematuria    [facility], [address], Sheria Boast, APRN    Office Visit

## 2022-02-15 NOTE — TELEPHONE ENCOUNTER
Requesting metoprolol refill, ,pharmacy verified. Warm transferred to [facility] to assist for establish care appointment. LOV telemedicine 1/16/21 Dr Jesu Willis.

## 2022-02-18 ENCOUNTER — OFFICE VISIT (OUTPATIENT)
Dept: INTERNAL MEDICINE CLINIC | Facility: CLINIC | Age: 57
End: 2022-02-18
Payer: COMMERCIAL

## 2022-02-18 VITALS
BODY MASS INDEX: 28.2 KG/M2 | WEIGHT: 197 LBS | HEART RATE: 81 BPM | HEIGHT: 70 IN | RESPIRATION RATE: 14 BRPM | DIASTOLIC BLOOD PRESSURE: 80 MMHG | SYSTOLIC BLOOD PRESSURE: 118 MMHG | OXYGEN SATURATION: 96 %

## 2022-02-18 DIAGNOSIS — Z00.00 ADULT GENERAL MEDICAL EXAM: ICD-10-CM

## 2022-02-18 DIAGNOSIS — F32.89 OTHER DEPRESSION: ICD-10-CM

## 2022-02-18 DIAGNOSIS — I10 ESSENTIAL HYPERTENSION WITH GOAL BLOOD PRESSURE LESS THAN 140/90: Primary | ICD-10-CM

## 2022-02-18 DIAGNOSIS — Z12.11 SCREEN FOR COLON CANCER: ICD-10-CM

## 2022-02-18 PROBLEM — R51.9 HEADACHE DISORDER: Status: RESOLVED | Noted: 2019-07-18 | Resolved: 2022-02-18

## 2022-02-18 PROBLEM — R53.83 FATIGUE: Status: RESOLVED | Noted: 2021-01-12 | Resolved: 2022-02-18

## 2022-02-18 PROBLEM — Z20.2 POTENTIAL EXPOSURE TO STD: Status: RESOLVED | Noted: 2019-07-18 | Resolved: 2022-02-18

## 2022-02-18 PROCEDURE — 3074F SYST BP LT 130 MM HG: CPT | Performed by: INTERNAL MEDICINE

## 2022-02-18 PROCEDURE — 3008F BODY MASS INDEX DOCD: CPT | Performed by: INTERNAL MEDICINE

## 2022-02-18 PROCEDURE — 99214 OFFICE O/P EST MOD 30 MIN: CPT | Performed by: INTERNAL MEDICINE

## 2022-02-18 PROCEDURE — 3079F DIAST BP 80-89 MM HG: CPT | Performed by: INTERNAL MEDICINE

## 2022-02-18 RX ORDER — METOPROLOL SUCCINATE 25 MG/1
25 TABLET, EXTENDED RELEASE ORAL
Qty: 90 TABLET | Refills: 3 | Status: SHIPPED | OUTPATIENT
Start: 2022-02-18

## 2022-02-18 RX ORDER — FLUOXETINE 10 MG/1
10 CAPSULE ORAL DAILY
Qty: 90 CAPSULE | Refills: 3 | Status: SHIPPED | OUTPATIENT
Start: 2022-02-18 | End: 2022-05-19

## 2022-06-24 ENCOUNTER — TELEMEDICINE (OUTPATIENT)
Dept: INTERNAL MEDICINE CLINIC | Facility: CLINIC | Age: 57
End: 2022-06-24
Payer: COMMERCIAL

## 2022-06-24 DIAGNOSIS — U07.1 COVID-19: Primary | ICD-10-CM

## 2022-06-24 PROCEDURE — 99213 OFFICE O/P EST LOW 20 MIN: CPT | Performed by: INTERNAL MEDICINE

## 2023-06-27 ENCOUNTER — OFFICE VISIT (OUTPATIENT)
Facility: CLINIC | Age: 58
End: 2023-06-27

## 2023-06-27 ENCOUNTER — LAB ENCOUNTER (OUTPATIENT)
Dept: LAB | Facility: HOSPITAL | Age: 58
End: 2023-06-27
Attending: INTERNAL MEDICINE
Payer: COMMERCIAL

## 2023-06-27 VITALS
BODY MASS INDEX: 26.92 KG/M2 | RESPIRATION RATE: 14 BRPM | HEART RATE: 88 BPM | HEIGHT: 70 IN | OXYGEN SATURATION: 97 % | WEIGHT: 188 LBS | DIASTOLIC BLOOD PRESSURE: 70 MMHG | SYSTOLIC BLOOD PRESSURE: 124 MMHG

## 2023-06-27 DIAGNOSIS — Z00.00 ADULT GENERAL MEDICAL EXAM: Primary | ICD-10-CM

## 2023-06-27 DIAGNOSIS — Z12.11 SCREEN FOR COLON CANCER: ICD-10-CM

## 2023-06-27 DIAGNOSIS — Z00.00 ADULT GENERAL MEDICAL EXAM: ICD-10-CM

## 2023-06-27 LAB
ALBUMIN SERPL-MCNC: 3.8 G/DL (ref 3.4–5)
ALBUMIN/GLOB SERPL: 1.1 {RATIO} (ref 1–2)
ALP LIVER SERPL-CCNC: 75 U/L
ALT SERPL-CCNC: 19 U/L
ANION GAP SERPL CALC-SCNC: 9 MMOL/L (ref 0–18)
AST SERPL-CCNC: 16 U/L (ref 15–37)
BILIRUB SERPL-MCNC: 1.3 MG/DL (ref 0.1–2)
BUN BLD-MCNC: 17 MG/DL (ref 7–18)
BUN/CREAT SERPL: 16.5 (ref 10–20)
CALCIUM BLD-MCNC: 8.9 MG/DL (ref 8.5–10.1)
CHLORIDE SERPL-SCNC: 108 MMOL/L (ref 98–112)
CHOLEST SERPL-MCNC: 157 MG/DL (ref ?–200)
CO2 SERPL-SCNC: 26 MMOL/L (ref 21–32)
COMPLEXED PSA SERPL-MCNC: 1.27 NG/ML (ref ?–4)
CREAT BLD-MCNC: 1.03 MG/DL
DEPRECATED RDW RBC AUTO: 39.8 FL (ref 35.1–46.3)
ERYTHROCYTE [DISTWIDTH] IN BLOOD BY AUTOMATED COUNT: 12.4 % (ref 11–15)
EST. AVERAGE GLUCOSE BLD GHB EST-MCNC: 103 MG/DL (ref 68–126)
FASTING PATIENT LIPID ANSWER: NO
FASTING STATUS PATIENT QL REPORTED: NO
GFR SERPLBLD BASED ON 1.73 SQ M-ARVRAT: 86 ML/MIN/1.73M2 (ref 60–?)
GLOBULIN PLAS-MCNC: 3.4 G/DL (ref 2.8–4.4)
GLUCOSE BLD-MCNC: 87 MG/DL (ref 70–99)
HBA1C MFR BLD: 5.2 % (ref ?–5.7)
HCT VFR BLD AUTO: 41.1 %
HDLC SERPL-MCNC: 43 MG/DL (ref 40–59)
HGB BLD-MCNC: 13.6 G/DL
LDLC SERPL CALC-MCNC: 86 MG/DL (ref ?–100)
MCH RBC QN AUTO: 29.2 PG (ref 26–34)
MCHC RBC AUTO-ENTMCNC: 33.1 G/DL (ref 31–37)
MCV RBC AUTO: 88.4 FL
NONHDLC SERPL-MCNC: 114 MG/DL (ref ?–130)
OSMOLALITY SERPL CALC.SUM OF ELEC: 297 MOSM/KG (ref 275–295)
PLATELET # BLD AUTO: 219 10(3)UL (ref 150–450)
POTASSIUM SERPL-SCNC: 4 MMOL/L (ref 3.5–5.1)
PROT SERPL-MCNC: 7.2 G/DL (ref 6.4–8.2)
RBC # BLD AUTO: 4.65 X10(6)UL
SODIUM SERPL-SCNC: 143 MMOL/L (ref 136–145)
TRIGL SERPL-MCNC: 164 MG/DL (ref 30–149)
TSI SER-ACNC: 2.6 MIU/ML (ref 0.36–3.74)
VLDLC SERPL CALC-MCNC: 26 MG/DL (ref 0–30)
WBC # BLD AUTO: 6.6 X10(3) UL (ref 4–11)

## 2023-06-27 PROCEDURE — 3008F BODY MASS INDEX DOCD: CPT | Performed by: INTERNAL MEDICINE

## 2023-06-27 PROCEDURE — 83036 HEMOGLOBIN GLYCOSYLATED A1C: CPT

## 2023-06-27 PROCEDURE — 84443 ASSAY THYROID STIM HORMONE: CPT

## 2023-06-27 PROCEDURE — 80061 LIPID PANEL: CPT

## 2023-06-27 PROCEDURE — 99396 PREV VISIT EST AGE 40-64: CPT | Performed by: INTERNAL MEDICINE

## 2023-06-27 PROCEDURE — 80053 COMPREHEN METABOLIC PANEL: CPT

## 2023-06-27 PROCEDURE — 3078F DIAST BP <80 MM HG: CPT | Performed by: INTERNAL MEDICINE

## 2023-06-27 PROCEDURE — 36415 COLL VENOUS BLD VENIPUNCTURE: CPT

## 2023-06-27 PROCEDURE — 3074F SYST BP LT 130 MM HG: CPT | Performed by: INTERNAL MEDICINE

## 2023-06-27 PROCEDURE — 85027 COMPLETE CBC AUTOMATED: CPT

## 2023-06-27 RX ORDER — METOPROLOL SUCCINATE 25 MG/1
25 TABLET, EXTENDED RELEASE ORAL
Qty: 90 TABLET | Refills: 3 | Status: SHIPPED | OUTPATIENT
Start: 2023-06-27

## 2023-08-21 RX ORDER — METOPROLOL SUCCINATE 25 MG/1
25 TABLET, EXTENDED RELEASE ORAL DAILY
Qty: 90 TABLET | Refills: 3 | OUTPATIENT
Start: 2023-08-21

## 2023-08-28 ENCOUNTER — TELEPHONE (OUTPATIENT)
Dept: INTERNAL MEDICINE CLINIC | Facility: CLINIC | Age: 58
End: 2023-08-28

## 2023-09-05 ENCOUNTER — HOSPITAL ENCOUNTER (EMERGENCY)
Facility: HOSPITAL | Age: 58
Discharge: HOME | End: 2023-09-05
Payer: COMMERCIAL

## 2023-09-05 VITALS
BODY MASS INDEX: 27.92 KG/M2 | WEIGHT: 195 LBS | HEART RATE: 68 BPM | OXYGEN SATURATION: 97 % | HEIGHT: 70 IN | TEMPERATURE: 98 F | DIASTOLIC BLOOD PRESSURE: 78 MMHG | RESPIRATION RATE: 18 BRPM | SYSTOLIC BLOOD PRESSURE: 138 MMHG

## 2023-09-05 DIAGNOSIS — T15.02XA: Primary | ICD-10-CM

## 2023-09-05 PROCEDURE — 99283 EMERGENCY DEPT VISIT LOW MDM: CPT

## 2023-09-05 PROCEDURE — 65220 REMOVE FOREIGN BODY FROM EYE: CPT

## 2023-09-05 RX ORDER — TETRACAINE HYDROCHLORIDE 5 MG/ML
1 SOLUTION OPHTHALMIC ONCE
Status: COMPLETED | OUTPATIENT
Start: 2023-09-05 | End: 2023-09-05

## 2023-09-05 RX ORDER — CIPROFLOXACIN HYDROCHLORIDE 3.5 MG/ML
2 SOLUTION/ DROPS TOPICAL
Qty: 1 EACH | Refills: 0 | Status: SHIPPED | OUTPATIENT
Start: 2023-09-05 | End: 2023-09-15

## 2023-09-05 RX ORDER — CIPROFLOXACIN HYDROCHLORIDE 3.5 MG/ML
1 SOLUTION/ DROPS TOPICAL ONCE
Status: COMPLETED | OUTPATIENT
Start: 2023-09-05 | End: 2023-09-05

## 2023-09-05 RX ORDER — ERYTHROMYCIN 5 MG/G
1 OINTMENT OPHTHALMIC ONCE
Status: COMPLETED | OUTPATIENT
Start: 2023-09-05 | End: 2023-09-05

## 2023-09-05 RX ORDER — TETRACAINE HYDROCHLORIDE 5 MG/ML
SOLUTION OPHTHALMIC
Status: COMPLETED
Start: 2023-09-05 | End: 2023-09-05

## 2023-09-05 NOTE — ED INITIAL ASSESSMENT (HPI)
Patient reports piece of wood possible in L eye that occurred yesterday. Denies visual changes, attempted to flush out at home. Patient reports pain.

## 2023-12-15 ENCOUNTER — PATIENT MESSAGE (OUTPATIENT)
Facility: CLINIC | Age: 58
End: 2023-12-15

## 2023-12-16 RX ORDER — FLUOXETINE 10 MG/1
10 CAPSULE ORAL DAILY
Qty: 90 CAPSULE | Refills: 0 | Status: SHIPPED | OUTPATIENT
Start: 2023-12-16 | End: 2024-03-15

## 2023-12-16 NOTE — TELEPHONE ENCOUNTER
Dr Niko Taylor  =see message,pended prescription, last refilled 2022. From: Verl Form  To: Sebastián Tia  Sent: 12/15/2023 10:30 AM CST  Subject: Prescription     Dr. Harry Solomon,      I was on Fuoxetine 8 m in the past.  I stopped taking it late last year or earlier this year. Not a fan of living on meds and I feel a little more mentally sharp off the med. However, I know the signs of my anxiety and depression. Crying during a sensitive tv show or fatigue for no reason. Can you order me a prescription for Fluoxetine 10 m?     Verl Form

## 2024-07-17 ENCOUNTER — HOSPITAL ENCOUNTER (OUTPATIENT)
Age: 59
Discharge: HOME | End: 2024-07-17
Attending: EMERGENCY MEDICINE
Payer: COMMERCIAL

## 2024-07-17 ENCOUNTER — APPOINTMENT (OUTPATIENT)
Dept: GENERAL RADIOLOGY | Age: 59
End: 2024-07-17
Attending: EMERGENCY MEDICINE
Payer: COMMERCIAL

## 2024-07-17 VITALS
HEART RATE: 62 BPM | DIASTOLIC BLOOD PRESSURE: 86 MMHG | RESPIRATION RATE: 16 BRPM | OXYGEN SATURATION: 98 % | SYSTOLIC BLOOD PRESSURE: 133 MMHG | TEMPERATURE: 98 F

## 2024-07-17 DIAGNOSIS — S90.426A BLISTER OF THIRD TOE: Primary | ICD-10-CM

## 2024-07-17 PROCEDURE — 99214 OFFICE O/P EST MOD 30 MIN: CPT

## 2024-07-17 PROCEDURE — 99213 OFFICE O/P EST LOW 20 MIN: CPT

## 2024-07-17 RX ORDER — CEPHALEXIN 500 MG/1
500 CAPSULE ORAL 3 TIMES DAILY
Qty: 15 CAPSULE | Refills: 0 | Status: SHIPPED | OUTPATIENT
Start: 2024-07-17 | End: 2024-07-22

## 2024-07-17 NOTE — ED INITIAL ASSESSMENT (HPI)
Patient arrived ambulatory to room c/o a blister to the 3rd digit on the right foot. Patient states he initially noticed it 2 weeks ago. Worsened over the last 3-4 days. Patient states he attempted to \"pop\" the blister initially and did not get anything out of it. +painful

## 2024-07-17 NOTE — ED PROVIDER NOTES
Patient Seen in: [facility] [place]      History     Chief Complaint   Patient presents with    Blisters     Stated Complaint: bump on toe    Subjective:   HPI    Patient is a 58-year-old male with no significant past medical history who presents now with blisterlike lesion of the right third toe.  The patient states he initially noted a small blister to the right third toe approximately 2-1/2 weeks ago.  The patient states he \"popped\" the blister, but over the last several days, the lesion has recurred and is now somewhat larger.  The patient denies any known injury to the toe.  Patient denies any fever    Objective:   Past Medical History:    Abdominal pain    Anxiety state, unspecified    Fatigue    Gastritis    EGD with biopsy    GERD (gastroesophageal reflux disease)    Drug therapy    Gilbert's syndrome    Headache disorder    Hx of appendicitis    Appendectomy    Hx of cholelithiasis    Cholecystectomy (2008)    Hx of hiatal hernia    Hydrocele, right    Observation    Physical exam, annual    Potential exposure to STD              Past Surgical History:   Procedure Laterality Date    Appendectomy      Cholecystectomy  2008    Upper gi endoscopy,biopsy  2011    EGD with Biopsy                Social History     Socioeconomic History    Marital status:    Tobacco Use    Smoking status: Never    Smokeless tobacco: Never   Vaping Use    Vaping status: Never Used   Substance and Sexual Activity    Alcohol use: No    Drug use: No    Sexual activity: Yes   Other Topics Concern    Caffeine Concern Yes     Comment: Coffee, 1 cup daily;     Reaction to local anesthetic No              Review of Systems    Positive for stated Chief Complaint: Blisters    Other systems are as noted in HPI.  Constitutional and vital signs reviewed.      All other systems reviewed and negative except as noted above.    Physical Exam     ED Triage Vitals [07/17/24 1840]   /86   Pulse 62   Resp 16   Temp 98 °F (36.7 °C)    Temp src Temporal   SpO2 98 %   O2 Device None (Room air)       Current Vitals:   Vital Signs  BP: 133/86  Pulse: 62  Resp: 16  Temp: 98 °F (36.7 °C)  Temp src: Temporal    Oxygen Therapy  SpO2: 98 %  O2 Device: None (Room air)            Physical Exam    Constitutional: Well-developed well-nourished in no acute distress  Head: Normocephalic, no swelling or tenderness  Eyes: Nonicteric sclera, no conjunctival injection  Vascular: Palpable right posterior tibial pulse with good capillary refill and all toes  Neurologic: Patient is awake, alert and oriented ×3.  The patient's motor strength is 5 out of 5 and symmetric in the upper and lower extremities bilaterally  Extremities: No focal swelling or tenderness  Skin: There is a 0.25 cm diameter blisterlike lesion on the dorsal aspect of the right third toe.  With gentle compression, this blister is popped with expression of some gelatinous material.  There is no dominique purulence.      ED Course   Labs Reviewed - No data to display          Will initiate p.o. Keflex due to mild surrounding erythema blisterlike lesion.  Patient declines x-ray at this time to rule out foreign body.  Will provide with podiatry follow-up         MDM      Foreign body versus blisterlike lesion of the right third toe                                   Medical Decision Making      Disposition and Plan     Clinical Impression:  1. Blister of third toe         Disposition:  Discharge  7/17/2024  6:58 pm    Follow-up:  Carolina Frost, NATALIE  [address],  [place] [id number] [phone]      For wound re-check for any recurrent blisterlike lesions          Medications Prescribed:  Current Discharge Medication List        START taking these medications    Details   cephalexin 500 MG Oral Cap Take 1 capsule (500 mg total) by mouth 3 (three) times daily for 5 days.  Qty: 15 capsule, Refills: 0

## 2024-07-31 ENCOUNTER — TELEMEDICINE (OUTPATIENT)
Dept: INTERNAL MEDICINE CLINIC | Facility: CLINIC | Age: 59
End: 2024-07-31

## 2024-07-31 DIAGNOSIS — I10 ESSENTIAL HYPERTENSION WITH GOAL BLOOD PRESSURE LESS THAN 140/90: Primary | ICD-10-CM

## 2024-07-31 DIAGNOSIS — Z00.00 ADULT GENERAL MEDICAL EXAM: ICD-10-CM

## 2024-07-31 DIAGNOSIS — Z12.11 SCREEN FOR COLON CANCER: ICD-10-CM

## 2024-07-31 DIAGNOSIS — F41.9 ANXIETY: ICD-10-CM

## 2024-07-31 RX ORDER — METOPROLOL SUCCINATE 25 MG/1
25 TABLET, EXTENDED RELEASE ORAL
Qty: 90 TABLET | Refills: 3 | Status: SHIPPED | OUTPATIENT
Start: 2024-07-31

## 2024-07-31 RX ORDER — FLUOXETINE 10 MG/1
10 CAPSULE ORAL DAILY
Qty: 90 CAPSULE | Refills: 0 | Status: SHIPPED | OUTPATIENT
Start: 2024-07-31

## 2024-08-03 VITALS — DIASTOLIC BLOOD PRESSURE: 86 MMHG | SYSTOLIC BLOOD PRESSURE: 130 MMHG

## 2024-08-03 NOTE — PROGRESS NOTES
Virtual Virtual Check-In    Ricardo Armstrong verbally consents to a Virtual Video Check-In service on 08/03/24. Patient understands and accepts financial responsibility for any deductible, co-insurance and/or co-pays associated with this service. This visit is conducted using Telemedicine with live, interactive video and audio.     I spoke with Ricardo Armstrong by secure video chat, verified date of birth, and discussed their current concerns:   Duration: 13 minutes    HPI  58-year-old gentleman requesting a telemedicine consultation for follow-up of hypertension and anxiety.  He reports that his medication is working well.  He has no complaints.  Requesting for a refill.    Review of Systems:   Review of Systems   Constitutional:  Negative for activity change, appetite change and fever.   HENT:  Negative for congestion and voice change.    Respiratory:  Negative for cough and shortness of breath.    Cardiovascular:  Negative for chest pain.   Gastrointestinal:  Negative for abdominal distention, abdominal pain and vomiting.   Genitourinary:  Negative for hematuria.   Skin:  Negative for wound.   Psychiatric/Behavioral:  Negative for behavioral problems.           Reviewed Active Problems:  Patient Active Problem List    Diagnosis    Essential hypertension with goal blood pressure less than 140/90    Snoring    Gastroesophageal reflux disease with esophagitis    Depression     Meds      Anxiety      Reviewed Past Medical History:  Past Medical History:    Abdominal pain    Anxiety state, unspecified    Fatigue    Gastritis    EGD with biopsy    GERD (gastroesophageal reflux disease)    Drug therapy    Gilbert's syndrome    Headache disorder    Hx of appendicitis    Appendectomy    Hx of cholelithiasis    Cholecystectomy (2008)    Hx of hiatal hernia    Hydrocele, right    Observation    Physical exam, annual    Potential exposure to STD      Reviewed Family History:  Family History   Problem Relation Age of Onset     Hypertension Father 73    Cancer Father         bladder cancer     Hypertension Mother 73    Gastro-Intestinal Disorder Mother         GERD    Arthritis Mother         Osteoarthritis    Obesity Sister     Obesity Sister        Reviewed Social History:  Social History     Socioeconomic History    Marital status:    Tobacco Use    Smoking status: Never    Smokeless tobacco: Never   Vaping Use    Vaping status: Never Used   Substance and Sexual Activity    Alcohol use: No    Drug use: No    Sexual activity: Yes   Other Topics Concern    Caffeine Concern Yes     Comment: Coffee, 1 cup daily;     Reaction to local anesthetic No      Reviewed Current Medications:  Current Outpatient Medications   Medication Sig Dispense Refill    metoprolol succinate ER 25 MG Oral Tablet 24 Hr Take 1 tablet (25 mg total) by mouth once daily. 90 tablet 3    FLUoxetine 10 MG Oral Cap Take 1 capsule (10 mg total) by mouth daily. 90 capsule 0          Physical Exam  There were no vitals filed for this visit.  Physical Exam   Constitutional:       General: He is not in acute distress.     Appearance: Normal appearance.   HENT:      Head: Normocephalic and atraumatic.      Nose: Nose normal.   Neurological:      Mental Status: He is alert and oriented to person, place, and time.   Psychiatric:         Behavior: Behavior normal.         Thought Content: Thought content normal.         Judgment: Judgment normal.       Diagnosis:  1. Essential hypertension with goal blood pressure less than 140/90  I talked to him again on August 3, 2024.  He reports that his blood pressures running in 130/80 range.  I have documented that in patient reported vitals.  Medication refilled.    2. Anxiety  Stable on current medical regimen.  Medication refilled.    I have ordered blood works as a part of his physical.  He will complete it soon.  I also encouraged him to complete his colonoscopy.     Plan: Medication refilled labs ordered referral given for  colonoscopy I will see him back in 6 months    Follow up: No follow-ups on file.    Please note that the following visit was completed using two-way, real-time interactive audio and/or video communication.  This has been done in good dorian to provide continuity of care in the best interest of the provider-patient relationship, due to the ongoing public health crisis/national emergency and because of restrictions of visitation.  There are limitations of this visit as no physical exam could be performed.  Every conscious effort was taken to allow for sufficient and adequate time.  This billing was spent on reviewing labs, medications, radiology tests and decision making.  Appropriate medical decision-making and tests are ordered as detailed in the plan of care above. Ricardo EUCEDA Nathaniel understands video evaluation is not a substitute for in person examination or emergency care. Patient advised to go to ER or call 911 for worsening symptoms or acute distress.     This note was prepared using Dragon [employer] voice recognition dictation software. As a result errors may occur. When identified these errors have been corrected. While every attempt is made to correct errors during dictation discrepancies may still exist.  Felix Barroso MD

## 2024-11-11 RX ORDER — FLUOXETINE 10 MG/1
10 CAPSULE ORAL DAILY
Qty: 90 CAPSULE | Refills: 3 | Status: SHIPPED | OUTPATIENT
Start: 2024-11-11

## 2024-11-11 NOTE — TELEPHONE ENCOUNTER
Please Review. Protocol Failed; No Protocol     Requested Prescriptions   Pending Prescriptions Disp Refills    FLUOXETINE 10 MG Oral Cap [Pharmacy Med Name: FLUOXETINE 10MG CAPSULES] 90 capsule 0     Sig: TAKE 1 CAPSULE(10 MG) BY MOUTH DAILY       Psychiatric Non-Scheduled (Anti-Anxiety) Failed - 11/11/2024 11:26 AM        Failed - Depression Screening completed within the past 12 months        Passed - In person appointment or virtual visit in the past 6 mos or appointment in next 3 mos     Recent Outpatient Visits              3 months ago Essential hypertension with goal blood pressure less than 140/90    [facility]    1 year ago Adult general medical exam    [facility] Felix Drake MD    Office Visit    2 years ago [address]    Telemedicine    2 years ago Essential hypertension with goal blood pressure less than 140/90    [facility]    3 years ago Fatigue, unspecified type    [facility] Christopher Dykes MD    Telemedicine                               Recent Outpatient Visits              3 months ago Essential hypertension with goal blood pressure less than 140/90    [facility]    1 year ago Adult general medical exam    [facility], Felix Drake MD    Office Visit    2 years ago COVIDFariba    [facility]    2 years ago Essential hypertension with goal blood pressure less than 140/90    [facility]    [facility]  [facility]    3 years ago Fatigue, unspecified type    [facility], Christopher Dykes MD    Telemedicine

## 2025-08-13 RX ORDER — METOPROLOL SUCCINATE 25 MG/1
25 TABLET, EXTENDED RELEASE ORAL
Qty: 90 TABLET | Refills: 3 | Status: SHIPPED | OUTPATIENT
Start: 2025-08-13 | End: 2025-08-14

## (undated) NOTE — ED AVS SNAPSHOT
Donato Reyes   MRN: [id number]    Department:  [facility] Emergency Department   Date of Visit:  5/18/2019           Disclosure     Insurance plans vary and the physician(s) referred by the ER may not be covered by your plan.  Please contac CARE PHYSICIAN AT ONCE OR RETURN IMMEDIATELY TO THE EMERGENCY DEPARTMENT. If you have been prescribed any medication(s), please fill your prescription right away and begin taking the medication(s) as directed.   If you believe that any of the medications

## (undated) NOTE — LETTER
2/14/2022              [address]        [address]         Dear Preethi Nguyễn,    This letter is to inform you that our office has made several attempts to reach you by phone without success. We were attempting to contact you by phone regarding establishing care with a new Primary care doctor as Dr. Lc Haywood has retired. Please contact our office at the number listed below as soon as you receive this letter to discuss this issue and to make the necessary changes in our system to your contact information. Thank you for your cooperation. Sincerely,    PHYSICIAN NONSTAFF  No primary provider on file.    [phone]        Document electronically generated by:  Nikole Barron MA

## (undated) NOTE — MR AVS SNAPSHOT
[place]  [address]  [phone]               Thank you for choosing us for your health care visit with Alysia Campbell MD.  We are glad to serve you and happy to provide you with this summary - amoxicillin 875 MG Tabs  - Esomeprazole Magnesium 40 MG Pack            MyChart     Sign up for [id number], your secure online medical record.   [employer]eb[employer] will allow you to access patient instructions from your recent visit,  view other health information, an

## (undated) NOTE — MR AVS SNAPSHOT
[address] [address]  [phone]               Thank you for choosing us for your health care visit with Marissa Baltazar MD.  We are glad to serve you and happy to provide you with this s visit,  view other health information, and more. To sign up or find more information, go to [email or web address] and click on the Sign Up Now link in the [employer] box.      Enter your Call Britannia Activation Code exactly as it appears below along with yo

## (undated) NOTE — MR AVS SNAPSHOT
[facility] [place] [address]  [address]  [phone]               Thank you for choosing us for your health care visit with Muriel Griggs MD.  We are glad to serve you and happy to provide you with this summary of Your unique [employer] Access Code: [email or web address]  Expires: 3/28/2017  9:21 PM    If you have questions, you can call [phone] to talk to our [facility] Staff. Remember, [employer] is NOT to be used for urgent needs. For medical emergencies, dial 911.